# Patient Record
Sex: FEMALE | Race: WHITE
[De-identification: names, ages, dates, MRNs, and addresses within clinical notes are randomized per-mention and may not be internally consistent; named-entity substitution may affect disease eponyms.]

---

## 2018-03-12 ENCOUNTER — HOSPITAL ENCOUNTER (INPATIENT)
Dept: HOSPITAL 93 - OB/GYN | Age: 28
LOS: 17 days | End: 2018-03-29
Attending: SPECIALIST | Admitting: SPECIALIST
Payer: COMMERCIAL

## 2018-03-12 VITALS — WEIGHT: 214 LBS | BODY MASS INDEX: 32.43 KG/M2 | HEIGHT: 68 IN

## 2018-03-12 DIAGNOSIS — Z3A.39: ICD-10-CM

## 2018-03-27 PROCEDURE — 0DQP0ZZ REPAIR RECTUM, OPEN APPROACH: ICD-10-PCS | Performed by: SPECIALIST

## 2018-03-27 PROCEDURE — 3E033VJ INTRODUCTION OF OTHER HORMONE INTO PERIPHERAL VEIN, PERCUTANEOUS APPROACH: ICD-10-PCS | Performed by: SPECIALIST

## 2018-03-27 PROCEDURE — 4A1HXCZ MONITORING OF PRODUCTS OF CONCEPTION, CARDIAC RATE, EXTERNAL APPROACH: ICD-10-PCS | Performed by: SPECIALIST

## 2019-01-02 ENCOUNTER — HOSPITAL ENCOUNTER (INPATIENT)
Dept: HOSPITAL 93 - ER | Age: 29
LOS: 1 days | Discharge: HOME | DRG: 343 | End: 2019-01-03
Attending: COLON & RECTAL SURGERY | Admitting: COLON & RECTAL SURGERY
Payer: COMMERCIAL

## 2019-01-02 VITALS — BODY MASS INDEX: 28.79 KG/M2 | HEIGHT: 68 IN | WEIGHT: 190 LBS

## 2019-01-02 DIAGNOSIS — K35.890: Primary | ICD-10-CM

## 2019-01-02 PROCEDURE — BW21ZZZ COMPUTERIZED TOMOGRAPHY (CT SCAN) OF ABDOMEN AND PELVIS: ICD-10-PCS | Performed by: COLON & RECTAL SURGERY

## 2019-01-02 PROCEDURE — 0DTJ4ZZ RESECTION OF APPENDIX, PERCUTANEOUS ENDOSCOPIC APPROACH: ICD-10-PCS | Performed by: COLON & RECTAL SURGERY

## 2019-01-02 NOTE — NUR
SE RECIBE PTE ALERTA Y ORIENTADA X 3 ESFERAS, LA CUAL REFIERE DOLOR ABDOMINAL
EN EL CUADRANTE INFERIOR RT, EL CUAL COMENZO ANTONELLA A LAS 8:30 PM
APROXIMADAMENTE. PTE EN AGUSTIN DE ESPERA.

## 2019-01-02 NOTE — NUR
PTE EVALUADA POR DR MIKEY DALEY [place]. MR LOUISA MICHELET ORIENTA SOBRE EL
MISMO, LO CUAL REFIERE ENTENDER, REALIZA PRUEBAS DE LABORATORIO Y ADMINISTRA
MEDICAMENTOS DANAY ORDEN MEDICA Y SIGUIENDO MEDIDAS ASEPTICAS.

## 2021-11-07 ENCOUNTER — HOSPITAL ENCOUNTER (EMERGENCY)
Dept: HOSPITAL 93 - ER | Age: 31
Discharge: HOME | End: 2021-11-07
Payer: COMMERCIAL

## 2021-11-07 VITALS — BODY MASS INDEX: 30.31 KG/M2 | WEIGHT: 200 LBS | HEIGHT: 68 IN

## 2021-11-07 DIAGNOSIS — M54.50: Primary | ICD-10-CM

## 2021-11-14 ENCOUNTER — HOSPITAL ENCOUNTER (EMERGENCY)
Dept: HOSPITAL 93 - ER | Age: 31
Discharge: HOME | End: 2021-11-14
Payer: COMMERCIAL

## 2021-11-14 VITALS — WEIGHT: 200 LBS | BODY MASS INDEX: 30.31 KG/M2 | HEIGHT: 68 IN

## 2021-11-14 DIAGNOSIS — M54.50: Primary | ICD-10-CM

## 2021-12-06 ENCOUNTER — HOSPITAL ENCOUNTER (OUTPATIENT)
Dept: HOSPITAL 93 - RAD 501 | Age: 31
Discharge: HOME | End: 2021-12-06
Attending: PEDIATRICS
Payer: COMMERCIAL

## 2021-12-06 DIAGNOSIS — Z23: Primary | ICD-10-CM

## 2023-03-11 ENCOUNTER — HOSPITAL ENCOUNTER (EMERGENCY)
Dept: HOSPITAL 93 - ER | Age: 33
Discharge: HOME | End: 2023-03-11
Payer: COMMERCIAL

## 2023-03-11 VITALS — BODY MASS INDEX: 31.18 KG/M2 | HEIGHT: 66 IN | WEIGHT: 194 LBS

## 2023-03-11 DIAGNOSIS — R10.2: Primary | ICD-10-CM

## 2025-02-18 ENCOUNTER — HOSPITAL ENCOUNTER (OUTPATIENT)
Dept: HOSPITAL 93 - MAMO-SONO | Age: 35
Discharge: HOME | End: 2025-02-18
Attending: OBSTETRICS & GYNECOLOGY
Payer: COMMERCIAL

## 2025-02-18 DIAGNOSIS — Z12.31: ICD-10-CM

## 2025-02-18 DIAGNOSIS — N60.12: ICD-10-CM

## 2025-02-18 DIAGNOSIS — N60.11: Primary | ICD-10-CM
